# Patient Record
Sex: FEMALE | URBAN - METROPOLITAN AREA
[De-identification: names, ages, dates, MRNs, and addresses within clinical notes are randomized per-mention and may not be internally consistent; named-entity substitution may affect disease eponyms.]

---

## 2022-06-08 ENCOUNTER — OFFICE VISIT (OUTPATIENT)
Facility: LOCATION | Age: 67
End: 2022-06-08
Payer: MEDICAID

## 2022-06-08 DIAGNOSIS — H40.013 OPEN ANGLE WITH BORDERLINE FINDINGS, LOW RISK, BILATERAL: Primary | ICD-10-CM

## 2022-06-08 DIAGNOSIS — H25.13 AGE-RELATED NUCLEAR CATARACT, BILATERAL: ICD-10-CM

## 2022-06-08 PROCEDURE — 92133 CPTRZD OPH DX IMG PST SGM ON: CPT | Performed by: OPTOMETRIST

## 2022-06-08 PROCEDURE — 92020 GONIOSCOPY: CPT | Performed by: OPTOMETRIST

## 2022-06-08 PROCEDURE — 99203 OFFICE O/P NEW LOW 30 MIN: CPT | Performed by: OPTOMETRIST

## 2022-06-08 PROCEDURE — 76514 ECHO EXAM OF EYE THICKNESS: CPT | Performed by: OPTOMETRIST

## 2022-06-08 ASSESSMENT — VISUAL ACUITY
OS: 20/25
OD: 20/25

## 2022-06-08 ASSESSMENT — INTRAOCULAR PRESSURE
OD: 15
OS: 17

## 2022-06-08 ASSESSMENT — KERATOMETRY
OS: 44.00
OD: 43.38

## 2022-06-08 NOTE — IMPRESSION/PLAN
Impression: Open angle with borderline findings, low risk, bilateral: H40.013. Plan: Patient advised of stable condition and IOP stable without medication Will continue to monitor intraocular pressure. No treatment is being implemented at this time. RTC: 3mo Visual Field 24-2 OU DFE and IOP Check IOP: 15/17 Treatment: none C/D ratio: 0.5/0.65 [facility] (6/8/22): OD: Good-no thinning, 89; OS: Good-no thinning, 97
24-2 (date):
Gonio (6/8/22): OU: , phacomorphic changes, 1+ pigment, IP, no PAS Pachy:  525/515 Allergies: none Surgery: none Family History: ?mother Notes:

## 2022-09-12 ENCOUNTER — OFFICE VISIT (OUTPATIENT)
Facility: LOCATION | Age: 67
End: 2022-09-12
Payer: MEDICAID

## 2022-09-12 DIAGNOSIS — H40.013 OPEN ANGLE WITH BORDERLINE FINDINGS, LOW RISK, BILATERAL: Primary | ICD-10-CM

## 2022-09-12 DIAGNOSIS — H25.13 AGE-RELATED NUCLEAR CATARACT, BILATERAL: ICD-10-CM

## 2022-09-12 PROCEDURE — 99213 OFFICE O/P EST LOW 20 MIN: CPT | Performed by: OPTOMETRIST

## 2022-09-12 PROCEDURE — 92083 EXTENDED VISUAL FIELD XM: CPT | Performed by: OPTOMETRIST

## 2022-09-12 ASSESSMENT — INTRAOCULAR PRESSURE
OS: 16
OD: 15

## 2022-09-12 NOTE — IMPRESSION/PLAN
Impression: Open angle with borderline findings, low risk, bilateral: H40.013. Plan: Baseline VF today. IOP stable. Continue to monitor without treatment. IOP: 15/16 Treatment: none C/D ratio: 0.5/0.65 [facility] (6/8/22): OD: Good-no thinning, 89; OS: Good-no thinning, 97
24-2 09/12/22 OD: Good-scattered non specific defects; OS: Good-scattered non specific defects Gonio (6/8/22): OU: , phacomorphic changes, 1+ pigment, IP, no PAS Pachy:  525/515 Allergies: none Surgery: none Family History: ?mother Notes: